# Patient Record
Sex: MALE | Race: BLACK OR AFRICAN AMERICAN | NOT HISPANIC OR LATINO | Employment: FULL TIME | URBAN - METROPOLITAN AREA
[De-identification: names, ages, dates, MRNs, and addresses within clinical notes are randomized per-mention and may not be internally consistent; named-entity substitution may affect disease eponyms.]

---

## 2019-11-04 ENCOUNTER — OFFICE VISIT (OUTPATIENT)
Dept: UROLOGY | Facility: CLINIC | Age: 49
End: 2019-11-04
Payer: COMMERCIAL

## 2019-11-04 VITALS
HEIGHT: 65 IN | HEART RATE: 90 BPM | WEIGHT: 205 LBS | SYSTOLIC BLOOD PRESSURE: 140 MMHG | DIASTOLIC BLOOD PRESSURE: 98 MMHG | BODY MASS INDEX: 34.16 KG/M2 | RESPIRATION RATE: 12 BRPM

## 2019-11-04 DIAGNOSIS — N40.0 BPH WITHOUT URINARY OBSTRUCTION: ICD-10-CM

## 2019-11-04 DIAGNOSIS — N52.9 ERECTILE DYSFUNCTION, UNSPECIFIED ERECTILE DYSFUNCTION TYPE: Primary | ICD-10-CM

## 2019-11-04 PROCEDURE — 3008F PR BODY MASS INDEX (BMI) DOCUMENTED: ICD-10-PCS | Mod: CPTII,S$GLB,, | Performed by: SPECIALIST

## 2019-11-04 PROCEDURE — 3008F BODY MASS INDEX DOCD: CPT | Mod: CPTII,S$GLB,, | Performed by: SPECIALIST

## 2019-11-04 PROCEDURE — 99204 PR OFFICE/OUTPT VISIT, NEW, LEVL IV, 45-59 MIN: ICD-10-PCS | Mod: S$GLB,,, | Performed by: SPECIALIST

## 2019-11-04 PROCEDURE — 99204 OFFICE O/P NEW MOD 45 MIN: CPT | Mod: S$GLB,,, | Performed by: SPECIALIST

## 2019-11-04 RX ORDER — LOSARTAN POTASSIUM AND HYDROCHLOROTHIAZIDE 12.5; 5 MG/1; MG/1
1 TABLET ORAL DAILY
Refills: 6 | COMMUNITY
Start: 2019-10-22

## 2019-11-04 RX ORDER — SILDENAFIL CITRATE 20 MG/1
20 TABLET ORAL
Qty: 90 TABLET | Refills: 11
Start: 2019-11-04 | End: 2020-11-03

## 2019-11-04 RX ORDER — SILDENAFIL CITRATE 20 MG/1
TABLET ORAL
Refills: 11 | COMMUNITY
Start: 2019-09-30

## 2019-11-04 RX ORDER — MONTELUKAST SODIUM 10 MG/1
10 TABLET ORAL DAILY
Refills: 6 | COMMUNITY
Start: 2019-10-22

## 2019-11-04 RX ORDER — PRAVASTATIN SODIUM 20 MG/1
20 TABLET ORAL DAILY
Refills: 4 | COMMUNITY
Start: 2019-10-23

## 2019-11-04 NOTE — PROGRESS NOTES
Subjective:       Patient ID: Dev Reyes is a 48 y.o. male.    Chief Complaint: Benign Prostatic Hypertrophy ( yearly/former [facility]ms pt) and Erectile Dysfunction (yearly/former [facility] pt)      HPI:  48-year-old man who is transferring his urological care to me.  He was seen by different urologists who has now left this practice.    Patient has erectile dysfunction he uses sildenafil citrate and seems to be working very well for him.  He takes about 3 of the 20 mg pills and that works very well for him.    He has BPH without any lower tract symptoms voids to completion empties his bladder very well very mild lower urinary tract symptoms.    He has good ejaculatory function.    Past Medical History:   Past Medical History:   Diagnosis Date    Allergy     BPH without urinary obstruction     ED (erectile dysfunction)     Hyperlipidemia     Hypertension     Kidney stone        Past Surgical Historical:   Past Surgical History:   Procedure Laterality Date    LITHOTRIPSY      pt unsure    NOSE SURGERY          Medications:   Medication List with Changes/Refills   Current Medications    LOSARTAN-HYDROCHLOROTHIAZIDE 50-12.5 MG (HYZAAR) 50-12.5 MG PER TABLET    Take 1 tablet by mouth once daily.    MONTELUKAST (SINGULAIR) 10 MG TABLET    Take 10 mg by mouth once daily.    PRAVASTATIN (PRAVACHOL) 20 MG TABLET    Take 20 mg by mouth once daily.    SILDENAFIL (REVATIO) 20 MG TAB            Past Social History:   Social History     Socioeconomic History    Marital status: Unknown     Spouse name: Not on file    Number of children: Not on file    Years of education: Not on file    Highest education level: Not on file   Occupational History    Not on file   Social Needs    Financial resource strain: Not on file    Food insecurity:     Worry: Not on file     Inability: Not on file    Transportation needs:     Medical: Not on file     Non-medical: Not on file   Tobacco Use    Smoking status: Never Smoker     Smokeless tobacco: Never Used   Substance and Sexual Activity    Alcohol use: Yes     Frequency: Monthly or less    Drug use: Never    Sexual activity: Not on file   Lifestyle    Physical activity:     Days per week: Not on file     Minutes per session: Not on file    Stress: Not on file   Relationships    Social connections:     Talks on phone: Not on file     Gets together: Not on file     Attends Islam service: Not on file     Active member of club or organization: Not on file     Attends meetings of clubs or organizations: Not on file     Relationship status: Not on file   Other Topics Concern    Not on file   Social History Narrative    Not on file       Allergies: Review of patient's allergies indicates:  No Known Allergies     Family History: History reviewed. No pertinent family history.     Review of Systems:  Review of Systems - General ROS: negative  Psychological ROS: negative  Ophthalmic ROS: negative  ENT ROS: negative  Allergy and Immunology ROS: negative  Hematological and Lymphatic ROS: negative  Endocrine ROS: negative  Respiratory ROS: no cough, shortness of breath, or wheezing  Cardiovascular ROS: no chest pain or dyspnea on exertion  Gastrointestinal ROS: no abdominal pain, change in bowel habits, or black or bloody stools  Genito-Urinary ROS: positive for - ED  Musculoskeletal ROS: negative  Neurological ROS: no TIA or stroke symptoms  Dermatological ROS: negative     Physical Exam:  General Appearance:    Alert, cooperative, no distress, appears stated age   Head:    Normocephalic, without obvious abnormality, atraumatic   Eyes:    PERRL, conjunctiva/corneas clear, EOM's intact, fundi     benign, both eyes        Ears:    Normal TM's and external ear canals, both ears   Nose:   Nares normal, septum midline, mucosa normal, no drainage    or sinus tenderness   Throat:   Lips, mucosa, and tongue normal; teeth and gums normal   Neck:   Supple, symmetrical, trachea midline, no  adenopathy;        thyroid:  No enlargement/tenderness/nodules; no carotid    bruit or JVD   Back:     Symmetric, no curvature, ROM normal, no CVA tenderness   Lungs:     Clear to auscultation bilaterally, respirations unlabored   Chest wall:    No tenderness or deformity   Heart:    Regular rate and rhythm, S1 and S2 normal, no murmur, rub   or gallop   Abdomen:     Soft, non-tender, bowel sounds active all four quadrants,     no masses, no organomegaly   Genitalia:    Normal male without lesion, discharge or tenderness, circumcised adequate meatus normal scrotum   Rectal:    Normal tone, normal prostate, no masses or tenderness;    Prostate is firm on both sides 1 fingerbreadth in dimensions   Extremities:   Extremities normal, atraumatic, no cyanosis or edema   Pulses:   2+ and symmetric all extremities   Skin:   Skin color, texture, turgor normal, no rashes or lesions   Lymph nodes:   Cervical, supraclavicular, and axillary nodes normal   Neurologic:   CNII-XII intact. Normal strength, sensation and reflexes       throughout         Assessment/Plan:       This 48-year-old man with erectile dysfunction and BPH without lower tract symptoms.  1. refill his prescription for sildenafil citrate today  2.  Obtain a serum PSA today  3.  Return to clinic in 1 year    Problem List Items Addressed This Visit        Renal/    Erectile dysfunction - Primary    BPH without urinary obstruction

## 2019-11-05 LAB — PSA, DIAGNOSTIC: 1.56 NG/ML (ref 0.1–4)

## 2019-11-12 ENCOUNTER — TELEPHONE (OUTPATIENT)
Dept: UROLOGY | Facility: CLINIC | Age: 49
End: 2019-11-12

## 2019-11-12 NOTE — TELEPHONE ENCOUNTER
----- Message from Love Monroe sent at 11/12/2019 12:36 PM CST -----  Contact: Patient  Type:  Patient Returning Call    Who Called:Patient  Who Left Message for Patient:Nurse Jenifer  Does the patient know what this is regarding?:yes  Would the patient rather a call back or a response via [facility]? Call back  Best Call Back Number:[phone]  Additional Information: Patient states if he does not answer leave a message on his voicemail

## 2019-11-12 NOTE — TELEPHONE ENCOUNTER
----- Message from Kyle Guzman MD sent at 11/9/2019  4:17 PM CST -----  Call with results good PSA for age

## 2020-11-04 ENCOUNTER — TELEPHONE (OUTPATIENT)
Dept: UROLOGY | Facility: CLINIC | Age: 50
End: 2020-11-04

## 2020-11-04 NOTE — TELEPHONE ENCOUNTER
Pt states that he is currently living in the [place] and would like to be referred to a urologist in that area, nurse advised pt that [facility] on [address] in [place], pt verbalized understanding. NB     ----- Message from Warren Malone sent at 11/3/2020  4:03 PM CST -----  Regarding: referral  Contact: [phone]  Pt states that he has moved out of the area and would like for someone from this office to poss. Refer him to someone in the area where he currently resides. Patient can be reached @ [phone] thanks

## 2023-06-06 PROBLEM — R35.1 NOCTURIA: Status: ACTIVE | Noted: 2023-06-06

## 2023-06-06 PROBLEM — Z87.442 HISTORY OF RENAL STONE: Status: ACTIVE | Noted: 2023-06-06
